# Patient Record
Sex: MALE
[De-identification: names, ages, dates, MRNs, and addresses within clinical notes are randomized per-mention and may not be internally consistent; named-entity substitution may affect disease eponyms.]

---

## 2020-04-20 ENCOUNTER — HOSPITAL ENCOUNTER (EMERGENCY)
Dept: HOSPITAL 8 - ED | Age: 30
Discharge: HOME | End: 2020-04-20
Payer: SELF-PAY

## 2020-04-20 VITALS — HEIGHT: 66 IN | BODY MASS INDEX: 30.12 KG/M2 | WEIGHT: 187.39 LBS

## 2020-04-20 VITALS — DIASTOLIC BLOOD PRESSURE: 98 MMHG | SYSTOLIC BLOOD PRESSURE: 151 MMHG

## 2020-04-20 DIAGNOSIS — J02.0: Primary | ICD-10-CM

## 2020-04-20 DIAGNOSIS — F17.200: ICD-10-CM

## 2020-04-20 PROCEDURE — 70360 X-RAY EXAM OF NECK: CPT

## 2020-04-20 PROCEDURE — 99283 EMERGENCY DEPT VISIT LOW MDM: CPT

## 2020-04-20 NOTE — NUR
Pt here for swollen throat and pain. Pt reports its hard to swallow and 
inabilty to clear his throat without hurting. Pt reports its been present for 3 
months however last night he noted to have white patches in the back of his 
throat. Pt had a fever, denies sob or any chest pain or pressure. Pt denies abd 
pain.